# Patient Record
Sex: MALE | Race: WHITE | NOT HISPANIC OR LATINO | URBAN - METROPOLITAN AREA
[De-identification: names, ages, dates, MRNs, and addresses within clinical notes are randomized per-mention and may not be internally consistent; named-entity substitution may affect disease eponyms.]

---

## 2023-08-25 ENCOUNTER — TELEPHONE (OUTPATIENT)
Dept: ORTHOPEDICS | Facility: CLINIC | Age: 78
End: 2023-08-25
Payer: MEDICARE

## 2023-08-25 DIAGNOSIS — M25.561 ACUTE PAIN OF BOTH KNEES: Primary | ICD-10-CM

## 2023-08-25 DIAGNOSIS — M25.562 ACUTE PAIN OF BOTH KNEES: Primary | ICD-10-CM

## 2023-08-25 NOTE — TELEPHONE ENCOUNTER
I called the patient today regarding his appointment. The patient is coming in to town just to see Dr. Ness. He had a friend who had his knees replaced by Dr. Ness (maybe a ANDRIY). So the patient wanted to come down to see Dr. Ness. The patient verbalized understanding and has no further questions.

## 2023-09-14 ENCOUNTER — HOSPITAL ENCOUNTER (OUTPATIENT)
Dept: RADIOLOGY | Facility: HOSPITAL | Age: 78
Discharge: HOME | End: 2023-09-14
Attending: ORTHOPAEDIC SURGERY
Payer: MEDICARE

## 2023-09-14 ENCOUNTER — OFFICE VISIT (OUTPATIENT)
Dept: ORTHOPEDICS | Facility: CLINIC | Age: 78
End: 2023-09-14
Payer: MEDICARE

## 2023-09-14 VITALS — WEIGHT: 269.38 LBS | BODY MASS INDEX: 35.7 KG/M2 | HEIGHT: 73 IN

## 2023-09-14 DIAGNOSIS — M19.09 PRIMARY OSTEOARTHRITIS, OTHER SPECIFIED SITE: ICD-10-CM

## 2023-09-14 DIAGNOSIS — M25.561 ACUTE PAIN OF BOTH KNEES: ICD-10-CM

## 2023-09-14 DIAGNOSIS — M25.562 ACUTE PAIN OF BOTH KNEES: ICD-10-CM

## 2023-09-14 DIAGNOSIS — Z01.818 PRE-OP TESTING: ICD-10-CM

## 2023-09-14 DIAGNOSIS — M17.11 PRIMARY OSTEOARTHRITIS OF RIGHT KNEE: Primary | ICD-10-CM

## 2023-09-14 DIAGNOSIS — R73.03 PREDIABETES: ICD-10-CM

## 2023-09-14 PROCEDURE — 99999 PR PBB SHADOW E&M-EST. PATIENT-LVL II: CPT | Mod: PBBFAC,,, | Performed by: ORTHOPAEDIC SURGERY

## 2023-09-14 PROCEDURE — 73564 X-RAY EXAM KNEE 4 OR MORE: CPT | Mod: TC,50

## 2023-09-14 PROCEDURE — 73564 XR KNEE ORTHO BILAT WITH FLEXION: ICD-10-PCS | Mod: 26,50,, | Performed by: RADIOLOGY

## 2023-09-14 PROCEDURE — 73564 X-RAY EXAM KNEE 4 OR MORE: CPT | Mod: 26,50,, | Performed by: RADIOLOGY

## 2023-09-14 PROCEDURE — 99204 PR OFFICE/OUTPT VISIT, NEW, LEVL IV, 45-59 MIN: ICD-10-PCS | Mod: S$PBB,,, | Performed by: ORTHOPAEDIC SURGERY

## 2023-09-14 PROCEDURE — 99204 OFFICE O/P NEW MOD 45 MIN: CPT | Mod: S$PBB,,, | Performed by: ORTHOPAEDIC SURGERY

## 2023-09-14 PROCEDURE — 99212 OFFICE O/P EST SF 10 MIN: CPT | Mod: PBBFAC | Performed by: ORTHOPAEDIC SURGERY

## 2023-09-14 PROCEDURE — 99999 PR PBB SHADOW E&M-EST. PATIENT-LVL II: ICD-10-PCS | Mod: PBBFAC,,, | Performed by: ORTHOPAEDIC SURGERY

## 2023-09-14 RX ORDER — ATORVASTATIN CALCIUM 40 MG/1
40 TABLET, FILM COATED ORAL
COMMUNITY

## 2023-09-14 RX ORDER — HYDROCODONE BITARTRATE AND ACETAMINOPHEN 10; 325 MG/1; MG/1
1 TABLET ORAL 2 TIMES DAILY PRN
COMMUNITY
Start: 2023-06-29

## 2023-09-14 RX ORDER — LOSARTAN POTASSIUM 100 MG/1
1 TABLET ORAL DAILY
COMMUNITY
Start: 2023-06-29

## 2023-09-14 RX ORDER — MEXILETINE HYDROCHLORIDE 250 MG/1
250 CAPSULE ORAL EVERY 12 HOURS
COMMUNITY
Start: 2023-06-26

## 2023-09-14 RX ORDER — NAPROXEN SODIUM 220 MG/1
81 TABLET, FILM COATED ORAL
COMMUNITY
End: 2023-09-28

## 2023-09-14 RX ORDER — FUROSEMIDE 40 MG/1
40 TABLET ORAL
COMMUNITY

## 2023-09-14 RX ORDER — TAMSULOSIN HYDROCHLORIDE 0.4 MG/1
1 CAPSULE ORAL DAILY
COMMUNITY
Start: 2023-07-31

## 2023-09-14 RX ORDER — SULFAMETHOXAZOLE AND TRIMETHOPRIM 800; 160 MG/1; MG/1
1 TABLET ORAL 2 TIMES DAILY
COMMUNITY
Start: 2023-03-24 | End: 2023-09-28

## 2023-09-14 RX ORDER — MULTIVITAMIN
1 TABLET ORAL DAILY
COMMUNITY
End: 2023-09-28

## 2023-09-14 RX ORDER — ALBUTEROL SULFATE 0.63 MG/3ML
0.63 SOLUTION RESPIRATORY (INHALATION)
COMMUNITY

## 2023-09-14 RX ORDER — PREGABALIN 150 MG/1
150 CAPSULE ORAL EVERY 8 HOURS
COMMUNITY
Start: 2023-06-30

## 2023-09-14 RX ORDER — TORSEMIDE 20 MG/1
20 TABLET ORAL 2 TIMES DAILY
COMMUNITY
Start: 2023-06-29

## 2023-09-14 RX ORDER — TRAZODONE HYDROCHLORIDE 150 MG/1
150 TABLET ORAL
COMMUNITY
Start: 2023-06-29

## 2023-09-14 RX ORDER — FLUTICASONE FUROATE, UMECLIDINIUM BROMIDE AND VILANTEROL TRIFENATATE 100; 62.5; 25 UG/1; UG/1; UG/1
1 POWDER RESPIRATORY (INHALATION)
COMMUNITY
Start: 2023-06-29

## 2023-09-14 RX ORDER — ISOSORBIDE DINITRATE 10 MG/1
10 TABLET ORAL 2 TIMES DAILY
COMMUNITY
Start: 2023-06-29

## 2023-09-14 RX ORDER — FINASTERIDE 5 MG/1
5 TABLET, FILM COATED ORAL
COMMUNITY
Start: 2023-06-29

## 2023-09-14 RX ORDER — OMEPRAZOLE 40 MG/1
1 CAPSULE, DELAYED RELEASE ORAL DAILY
COMMUNITY
Start: 2023-05-09

## 2023-09-15 ENCOUNTER — TELEPHONE (OUTPATIENT)
Dept: ORTHOPEDICS | Facility: CLINIC | Age: 78
End: 2023-09-15
Payer: MEDICARE

## 2023-09-15 NOTE — TELEPHONE ENCOUNTER
Clearance letters sent for:   -pulmonology  - gastroenterology  -cardiology    Combo letter and labs sent to pcp's office.     Pt has hx of MRSA - will need bactroban pre/post op    Attempted to reach patient - no answer; mailbox full; has not signed up for portal.  Emailed patient and asked him to call me.  Need to ask about dental and ekg. Need to discuss plan.   Has appt with thoracic surgeon and cards next week.

## 2023-09-18 ENCOUNTER — PATIENT MESSAGE (OUTPATIENT)
Dept: ADMINISTRATIVE | Facility: OTHER | Age: 78
End: 2023-09-18
Payer: MEDICARE

## 2023-09-18 ENCOUNTER — TELEPHONE (OUTPATIENT)
Dept: ORTHOPEDICS | Facility: CLINIC | Age: 78
End: 2023-09-18
Payer: MEDICARE

## 2023-09-18 NOTE — TELEPHONE ENCOUNTER
Pt returned my call.   Will need dental clearance.  States he will call now to make appt.   Thinks that appt with thoracic surgeon is r/t stable lung nodule. Will review note after appt on 9/21.   States had ekg in June at cardiology visit.     Did let him know I've reached out to make sure his surgery will be approved; have sent clearance letters to providers; have sent labs done here as well as preop/post op request to pcp.   We discussed need for bactroban.     States he spoke with Patricia and Angelique with [facility] about PT.  Unsure who these folks are, but will try to figure it out so that he can be scheduled appropriately. Pt states last four of phone number are [phone].

## 2023-09-19 ENCOUNTER — TELEPHONE (OUTPATIENT)
Dept: ORTHOPEDICS | Facility: CLINIC | Age: 78
End: 2023-09-19
Payer: MEDICARE

## 2023-09-19 NOTE — TELEPHONE ENCOUNTER
Dental appointment will be on Sep 26 at 8am   [facility] in [place], [place]  Ph/ [phone]  Fax/ [phone]    PT upon returning home will be:   [facility]  Ph/ [phone]     Fax/ [phone]      Have made PT appts here, so that they can go through pre-auth process.  Have gotten auth for surgery itself.    Updated patient on both of these and the workflow for his stay while here. Pt v/u.  No questions at this time.     So far, I have been unable to find a recent ekg - may have him do this with pcp at appt.

## 2023-09-19 NOTE — TELEPHONE ENCOUNTER
----- Message from Ladonna Nicolas RN sent at 9/19/2023  4:44 PM CDT -----  Regarding: RE: pseudo ANDRIY patient: R TKA 11/1/23  Derrick Ness and Cameron,   Just wanted to update you that we've gotten approval from Medicare for surgery and PT while patient is on site.    I did hear back from patient's pulmonologist today.   They said: not clear. Has COPD and right lung lesion of ? Etiology.  Diff dx to include vascular lesion.       I can see the patient has an appt in a couple days with thoracic surgeon.  Patient states this appt may be re: nodule.  He says that he saw the same pramod a year ago, but I've yet to find the note. The nodule has been stable on CT x 1.5 years.    His PET from last year did show hypermetabolic area.  I have been able to find subsequent EGD encounters that have further information on this area.     Hope to do his medical questionnaire in the next day or two, but would appreciate knowing if there's anything further that either of you may need as the patient travels quite a bit and it may be hard to pin him down to get things done.     Thanks,  Ladonna    ----- Message -----  From: Khushi Barnes MD  Sent: 9/15/2023   9:30 AM CDT  To: Mary Kate Fontaine NP; Letty Fine RN; #  Subject: RE: pseudo ANDRIY patient: R TKA 11/1/23            Thank you   Please let me know,When done with the initial Medical Questionnaire     ----- Message -----  From: Ladonna Nicolas RN  Sent: 9/15/2023   9:29 AM CDT  To: Mary Kate Fontaine NP; #  Subject: RE: pseudo ANDRIY patient: R TKA 11/1/23            Emailed preservice mgmt for assistance in determining coverage     In anticipation of approval -     Clearance letters sent for:   -pulmonology  - gastroenterology  -cardiology    Combo letter and labs sent to pcp's office.     Pt has hx of MRSA - will need bactroban pre/post op    Attempted to reach patient - no answer; mailbox full; has not signed up for portal.  Emailed patient and asked him to call me.  Need to  ask about dental and ekg. Need to discuss plan.   Has appt with thoracic surgeon and cards next week.         ----- Message -----  From: Rober Ness III, MD  Sent: 9/15/2023   7:19 AM CDT  To: Mary Kate Fontaine NP; #  Subject: pseudo ANDRIY patient: R TKA 11/1/23                The patient lives in [place], he is friends with Vincenzo Norwood, who was one of our [facility] TKA patients.   He wants a TKA and flew himself here and showed up in clinic yesterday.   I've scheduled him for 11/1/23 which is a Wednesday  I got a set up pre-op labs yesterday which look good    Plan is that he will basically be a self funded [facility] patient so would be ideal if we could do a similar process to the [facility] patients:  -confirm that his insurance will cover surgery here at [facility]  -have him get a letter of medical clearance from his PCP in [facility]  -have him set up for all his pre-op visits with ortho + [facility] on 10/31/23  -will keep him 1 night at [place] and then he and wife will stay in [facility]  -see him back in clinic 11/7/23 for post op check/clearance to travel    -I did tell him that he may need an order for post op PT to come from his PCP as I dont have a [place] medical license    Thank you all,   Will

## 2023-09-21 ENCOUNTER — DOCUMENTATION ONLY (OUTPATIENT)
Dept: INTERNAL MEDICINE | Facility: CLINIC | Age: 78
End: 2023-09-21
Payer: MEDICARE

## 2023-09-21 DIAGNOSIS — R73.03 PREDIABETES: ICD-10-CM

## 2023-09-21 DIAGNOSIS — R91.1 LESION OF LUNG: ICD-10-CM

## 2023-09-21 DIAGNOSIS — Z86.14 HISTORY OF MRSA INFECTION: ICD-10-CM

## 2023-09-21 DIAGNOSIS — Z92.89 HISTORY OF STRESS TEST: ICD-10-CM

## 2023-09-21 DIAGNOSIS — F11.90 OPIOID USE: ICD-10-CM

## 2023-09-21 DIAGNOSIS — Z87.438 HISTORY OF BPH: ICD-10-CM

## 2023-09-21 DIAGNOSIS — J44.9 CHRONIC OBSTRUCTIVE PULMONARY DISEASE, UNSPECIFIED COPD TYPE: ICD-10-CM

## 2023-09-21 DIAGNOSIS — Z01.89 LABORATORY TEST: ICD-10-CM

## 2023-09-21 DIAGNOSIS — I49.3 PVC (PREMATURE VENTRICULAR CONTRACTION): ICD-10-CM

## 2023-09-21 PROBLEM — M25.562 CHRONIC PAIN OF BOTH KNEES: Status: ACTIVE | Noted: 2020-12-23

## 2023-09-21 PROBLEM — N40.0 BENIGN PROSTATIC HYPERPLASIA: Status: ACTIVE | Noted: 2020-12-23

## 2023-09-21 PROBLEM — G47.33 OSA (OBSTRUCTIVE SLEEP APNEA): Status: ACTIVE | Noted: 2022-01-10

## 2023-09-21 PROBLEM — E66.9 OBESITY (BMI 30.0-34.9): Status: ACTIVE | Noted: 2022-01-10

## 2023-09-21 PROBLEM — M25.561 CHRONIC PAIN OF BOTH KNEES: Status: ACTIVE | Noted: 2020-12-23

## 2023-09-21 PROBLEM — Z87.440 HISTORY OF UTI: Status: ACTIVE | Noted: 2020-12-23

## 2023-09-21 PROBLEM — E78.5 HYPERLIPIDEMIA: Status: ACTIVE | Noted: 2020-12-23

## 2023-09-21 PROBLEM — E66.811 OBESITY (BMI 30.0-34.9): Status: ACTIVE | Noted: 2022-01-10

## 2023-09-21 PROBLEM — I10 ESSENTIAL HYPERTENSION: Status: ACTIVE | Noted: 2020-12-23

## 2023-09-21 PROBLEM — G89.29 CHRONIC BILATERAL LOW BACK PAIN WITH BILATERAL SCIATICA: Status: ACTIVE | Noted: 2020-12-23

## 2023-09-21 PROBLEM — M54.42 CHRONIC BILATERAL LOW BACK PAIN WITH BILATERAL SCIATICA: Status: ACTIVE | Noted: 2020-12-23

## 2023-09-21 PROBLEM — N40.0 BENIGN PROSTATIC HYPERPLASIA: Status: RESOLVED | Noted: 2020-12-23 | Resolved: 2023-09-21

## 2023-09-21 PROBLEM — K21.9 GASTROESOPHAGEAL REFLUX DISEASE: Status: ACTIVE | Noted: 2020-12-23

## 2023-09-21 PROBLEM — G89.29 CHRONIC PAIN OF BOTH KNEES: Status: ACTIVE | Noted: 2020-12-23

## 2023-09-21 PROBLEM — M54.41 CHRONIC BILATERAL LOW BACK PAIN WITH BILATERAL SCIATICA: Status: ACTIVE | Noted: 2020-12-23

## 2023-09-21 PROBLEM — G62.9 NEUROPATHY: Status: ACTIVE | Noted: 2020-12-23

## 2023-09-21 NOTE — PROGRESS NOTES
[facility] [address]  Progress Note    Patient Name: Richi Funk  MRN: [id number]  Date of Evaluation- 09/21/2023  PCP- No, Primary Doctor    Future cases for Richi Funk [[id number]]       Case ID Status Date Time Alonso Procedure Provider Location    [id number] [facility] 11/1/2023  1:50  ARTHROPLASTY, KNEE, TOTAL: RIGHT: DEPUY - ATTRober Avelar III, MD [[id number]] [facility] OR            HPI:  Chart reviewed in preparation for joint replacement surgery   Male of age 78 years    Health conditions of significance for the perioperative period      - BMI 36.04-  - COPD and right lung lesion of ? Etiology  - Opioid use   - BPH on flomax  - Spine surgery - decreased sensation L knee to foot  - Pre DM  Frequent UTI's  Lung nodule - serial surveillance   - HTN  - MELIDA           Assessment/Plan:     Lesion of lung  As per the correspondence from the nursing staff, he is due to see the thoracic surgeon about this     March 2023      Stable chest. No acute process.     Somewhat tubular/nodular density within the right upper lobe which may be   vascular in etiology. Stability suggests benign etiology. Delayed follow-up   chest CT in 1 year can be obtained to confirm long-term stability.     Pulmonary evaluation from June 2023    Impression: Thus far lesion right upper lobe has benign behavior, moderate COPD, Radiology has raised question of this lung lesion being vascular lesion, perhaps AVM.    Plan: Continue treatment of COPD. Advised patient of impression of radiologist most recent CT chest, before patient proceeds with biopsy I recommend he go back and follow-up as he is already scheduled to do with CTS Dr. Camejo next month to let him know about this CT report, discuss the plan for biopsy. Explained what AVMs are, the need for dealing with them. Told him that [facility] is available on specialty level for vascular lesion as well if he wanted. Overall appears stable         History of MRSA infection  I suggest nasal Bactroban  decolonization in attempt to prevent surgical site infection prophylaxis       Opioid use  Norco   I suggest working with his prescribing MD about possibly reducing the Norco, if able to, to reduce the opioid tolerance in preparation for the upcoming joint replacement surgery    History of BPH  He will be at risk of postoperative urinary retention given the BPH know and I suggest monitoring his bladder volume during and immediately after surgery to be able to address potential bladder stretch and urinary retention as a result  I suggest using straight catheterization to decompress the bladder before it gets to the point of bladder stretching and urinary retention    BMI 36.0-36.9,adult  Noted  Hypertension   GERD  Osteoarthritis    COPD (chronic obstructive pulmonary disease)  On Trelegy, albuterol    Feb 2021    IMPRESSION:  Minimal obstructive lung disease with insignificant response to bronchodilator.        History of stress test  July 2023    No evidence of myocardial infarction. No evidence of induced myocardial   ischemia.     June 2023        Nov 2022        May 2022    1. Sinus bradycardia.   2. Frequent PVCs accounting for 9% of total QRSs.  These are randomly presented without any pattern.  There is no VT.      Jan 2022    Left main: 0%   Left anterior descending artery: 40% mid lesion   Left circumflex artery: 30% prox lesion   Right coronary artery: 60% distal lesion     iFR of distal RCA was 1.00 consistent with non-obstructive disease.     Conclusion: significant but non-obstructive coronary artery disease          Laboratory test  A1c from September 14 th 2023 was 5.8  INR- 1.1  Comprehensive metabolic panel is unremarkable   CBC is unremarkable    PVC (premature ventricular contraction)  Cardiac evaluation from June 2023     IMPRESSION:  ICD-10-CM ICD-9-CM   1. Stable angina pectoris I20.8 413.9       ASSESSMENT & PLAN: Frequent PVCs controlled on mexiletine. Last count the PVCs were 9% on the  Holter. Ejection fraction 55 with most recent echo.  Chest pain lexiscan.  CAD hx pvc cobntrolled      Prediabetes  Most recent A1c from September 2023 is 5.8

## 2023-09-21 NOTE — ASSESSMENT & PLAN NOTE
A1c from September 14 th 2023 was 5.8  INR- 1.1  Comprehensive metabolic panel is unremarkable   CBC is unremarkable

## 2023-09-21 NOTE — ASSESSMENT & PLAN NOTE
He will be at risk of postoperative urinary retention given the BPH know and I suggest monitoring his bladder volume during and immediately after surgery to be able to address potential bladder stretch and urinary retention as a result  I suggest using straight catheterization to decompress the bladder before it gets to the point of bladder stretching and urinary retention

## 2023-09-21 NOTE — ASSESSMENT & PLAN NOTE
Norco   I suggest working with his prescribing MD about possibly reducing the Norco, if able to, to reduce the opioid tolerance in preparation for the upcoming joint replacement surgery

## 2023-09-21 NOTE — ASSESSMENT & PLAN NOTE
I suggest nasal Bactroban decolonization in attempt to prevent surgical site infection prophylaxis

## 2023-09-21 NOTE — ASSESSMENT & PLAN NOTE
July 2023    No evidence of myocardial infarction. No evidence of induced myocardial   ischemia.     June 2023        Nov 2022        May 2022    1. Sinus bradycardia.   2. Frequent PVCs accounting for 9% of total QRSs.  These are randomly presented without any pattern.  There is no VT.      Jan 2022    Left main: 0%   Left anterior descending artery: 40% mid lesion   Left circumflex artery: 30% prox lesion   Right coronary artery: 60% distal lesion     iFR of distal RCA was 1.00 consistent with non-obstructive disease.     Conclusion: significant but non-obstructive coronary artery disease

## 2023-09-21 NOTE — ASSESSMENT & PLAN NOTE
Cardiac evaluation from June 2023     IMPRESSION:  ICD-10-CM ICD-9-CM   1. Stable angina pectoris I20.8 413.9       ASSESSMENT & PLAN: Frequent PVCs controlled on mexiletine. Last count the PVCs were 9% on the Holter. Ejection fraction 55 with most recent echo.  Chest pain lexiscan.  CAD hx pvc cobntrolled

## 2023-09-21 NOTE — ASSESSMENT & PLAN NOTE
On Trelegy, albuterol    Feb 2021    IMPRESSION:  Minimal obstructive lung disease with insignificant response to bronchodilator.

## 2023-09-21 NOTE — ASSESSMENT & PLAN NOTE
As per the correspondence from the nursing staff, he is due to see the thoracic surgeon about this     March 2023      Stable chest. No acute process.     Somewhat tubular/nodular density within the right upper lobe which may be   vascular in etiology. Stability suggests benign etiology. Delayed follow-up   chest CT in 1 year can be obtained to confirm long-term stability.     Pulmonary evaluation from June 2023    Impression: Thus far lesion right upper lobe has benign behavior, moderate COPD, Radiology has raised question of this lung lesion being vascular lesion, perhaps AVM.    Plan: Continue treatment of COPD. Advised patient of impression of radiologist most recent CT chest, before patient proceeds with biopsy I recommend he go back and follow-up as he is already scheduled to do with CTS Dr. Camejo next month to let him know about this CT report, discuss the plan for biopsy. Explained what AVMs are, the need for dealing with them. Told him that [facility] is available on specialty level for vascular lesion as well if he wanted. Overall appears stable

## 2023-09-27 ENCOUNTER — TELEPHONE (OUTPATIENT)
Dept: ORTHOPEDICS | Facility: CLINIC | Age: 78
End: 2023-09-27
Payer: MEDICARE

## 2023-09-27 NOTE — TELEPHONE ENCOUNTER
Contacted patient - has not spoken with pcp's office.  Will call tomorrow.  Emailed patient a copy of the letter as well as my direct number for MD to call if he has questions.     Will send dental clearance form as appt was on 9/26.      Pt states he cannot go for pcp clearance visit until after he returns home on 10/16.  Pt needs ekg.     Will do med review and discuss Dr. Barnes's questions tomorrow - pt req 1300.      Haven't rec'd response to cards and GI clearance requests.  Letters refaxed at this time.

## 2023-09-28 ENCOUNTER — TELEPHONE (OUTPATIENT)
Dept: ORTHOPEDICS | Facility: CLINIC | Age: 78
End: 2023-09-28
Payer: MEDICARE

## 2023-09-28 RX ORDER — MONTELUKAST SODIUM 10 MG/1
10 TABLET ORAL NIGHTLY
COMMUNITY
Start: 2023-06-29

## 2023-09-28 NOTE — TELEPHONE ENCOUNTER
HAS PCP CLEARANCE APPT ON 10/21/23    Spoke to patient on: 9/28/23    Surgeon: Rober Ness    Surgery: right Knee    Travel caregiver: wife    Will travel via: car    BMI:  36.04    Social Hx:     Updated in Epic              Patient works at retired            Plan to return home after surgery:  Yes            Have support to help with care and appointments: Yes     Allergies:    Updated in epic    Medication list:    Updated in epic    Covid-19:   - vaccine: yes   - diagnosed with: yes, no hospitalization, no O2          Health Hx:   Updated in [facility]    Surgical Hx:    Updated in Epic    Can you take one flight of stairs: yes    RCRI:   - Coronary Artery Disease: yes   - Congestive Heart Failure: no   - Cerebrovascular Disease: no   - Diabetes Mellitus on insulin: no      ROS:   - Fever/chills: no   - Infection: no   - Cough/Resp Issues:  no   - Bleeding/blood loss (vaginal, rectal, vomiting, urination):  no   - GI issues/Acid Reflux/Vomiting: yes   - Fatty liver: no   - UTI: no   - Constipation:  yes - occasional , will bring miralax with him   - MRSA colonization: yes - after brown recluse bite  - Skin issues (rashes, blisters, boils, bumps): no    - Recent injection in operative joint: no   - Strokes or Passing out: no   - Blood Thinners or ASA: no   - Blood clot in heart/lung/extremity: no   - Stents: no   - Depression or Anxiety: no   - Problems with anesthesia:  no   - If female, having periods/pregnant: N/A  - Vision problems: no  - Dental exam recently: yes    STOPBANG   Has MELIDA dx, does not use CPAP     Family Hx:    Updated in Epic        Please check if he has ongoing chest pains  --- no  how  is he  urinating ? Is he able to empty the bladder completely  -- urinating without issue, able to empty bladder  Spine surgery - lumbar fusion in 2008, [place], [place]; Dr. Hernández  Narcotic use - 1 tab po every 2-3 days - no issue with stopping in advance if necessary  Pt aware of  Bactroban 2 days before, day of, and 2 days after surgery intranasally BID  Reports doing okay with asthma, not using rescue inhaler regularly, does rinse mouth after using Trelegy  Neuropathy - nerve was cut during lumbar spine surgery and lost most of feeling left knee down, no neuropathy anywhere else  No current UTI, will call if something changes. Will also call if any skin issues arise.  RE: furosemide and torsemide - was recently called by pcp and told he shouldn't be taking both but they've not called back to tell him which to stop          Knee replacement patients will have several weeks of PT after returning home.

## 2023-10-16 ENCOUNTER — DOCUMENTATION ONLY (OUTPATIENT)
Dept: INTERNAL MEDICINE | Facility: CLINIC | Age: 78
End: 2023-10-16
Payer: MEDICARE

## 2023-10-16 ENCOUNTER — TELEPHONE (OUTPATIENT)
Dept: ORTHOPEDICS | Facility: CLINIC | Age: 78
End: 2023-10-16
Payer: MEDICARE

## 2023-10-16 DIAGNOSIS — Z98.890 PERSONAL HISTORY OF SPINE SURGERY: ICD-10-CM

## 2023-10-16 DIAGNOSIS — F11.90 OPIOID USE: ICD-10-CM

## 2023-10-16 DIAGNOSIS — K59.00 CONSTIPATION, UNSPECIFIED CONSTIPATION TYPE: ICD-10-CM

## 2023-10-16 DIAGNOSIS — G62.9 NEUROPATHY: ICD-10-CM

## 2023-10-16 DIAGNOSIS — G47.33 OSA (OBSTRUCTIVE SLEEP APNEA): ICD-10-CM

## 2023-10-16 DIAGNOSIS — Z86.16 HISTORY OF COVID-19: ICD-10-CM

## 2023-10-16 NOTE — PROGRESS NOTES
[facility] [address]  Progress Note    Patient Name: Richi Funk  MRN: [id number]  Date of Evaluation- 10/16/2023  PCP- No, Primary Doctor    Future cases for Richi Funk [[id number]]       Case ID Status Date Time Alonso Procedure Provider Location    [id number] [facility] 11/1/2023  1:47  ARTHROPLASTY, KNEE, TOTAL: RIGHT: DEPUY - Rober Cabral III, MD [[id number]] [facility] OR            HPI:  Followed up     Received correspondence       -he has an appt for pcp clearance on 10/21/23           Assessment/Plan:     Constipation  Miralax    MELIDA (obstructive sleep apnea)   Has MELIDA dx, does not use CPAP          Neuropathy    Neuropathy - nerve was cut during lumbar spine surgery and lost most of feeling left knee down, no neuropathy anywhere else           Opioid use    Narcotic use - 1 tab po every 2-3 days - no issue with stopping in advance if necessary

## 2023-10-16 NOTE — TELEPHONE ENCOUNTER
Patient returned call.     Asked about neuropathy.  Patient reports last he does have some minor issues with balance, but over all does not think there will be any issues with ambulation post op.   Reports he has no issues with swallowing.     I let him know that his cardiologist's office has not responded to optimization statement. He has an appt with their office on 10/20.  I asked him to please ask provider to put statement in note as we can see records in Epic.   He is seeing thoracic surgeon on 10/24.    He is seeing pcp on 10/26.      He and I discussed medication hold preop.    Advised him to arrive on 10/30 in anticipation of all preop appts on 10/31.   Planning for surgery on 11/1.

## 2023-10-16 NOTE — ASSESSMENT & PLAN NOTE
Narcotic use - 1 tab po every 2-3 days - no issue with stopping in advance if necessary

## 2023-10-18 DIAGNOSIS — M17.11 PRIMARY OSTEOARTHRITIS OF RIGHT KNEE: Primary | ICD-10-CM

## 2023-10-19 ENCOUNTER — TELEPHONE (OUTPATIENT)
Dept: ORTHOPEDICS | Facility: CLINIC | Age: 78
End: 2023-10-19
Payer: MEDICARE

## 2023-10-19 NOTE — TELEPHONE ENCOUNTER
Patient left voicemail requesting a return call.    Confirming arrival date (10/30).  Patient has appt tomorrow - advised him this is with cardiology for testing needed for clearance.    He asked about bactroban - advised pcp at home will need to order. I've sent a letter to NP at primary care and resent Jonny copy of the letter.

## 2023-10-27 ENCOUNTER — TELEPHONE (OUTPATIENT)
Dept: ORTHOPEDICS | Facility: CLINIC | Age: 78
End: 2023-10-27
Payer: MEDICARE

## 2023-10-27 ENCOUNTER — DOCUMENTATION ONLY (OUTPATIENT)
Dept: INTERNAL MEDICINE | Facility: CLINIC | Age: 78
End: 2023-10-27
Payer: MEDICARE

## 2023-10-27 DIAGNOSIS — I51.9 LEFT VENTRICULAR SYSTOLIC DYSFUNCTION (LVSD): ICD-10-CM

## 2023-10-27 DIAGNOSIS — R91.1 LESION OF LUNG: ICD-10-CM

## 2023-10-27 NOTE — ASSESSMENT & PLAN NOTE
Neuropathy.  Patient reports last he does have some minor issues with balance, but over all does not think there will be any issues with ambulation post op.

## 2023-10-27 NOTE — TELEPHONE ENCOUNTER
I called the patient today regarding his surgery. The patient stated that he has to go wear a monitoring device. I told him that we have to get a clearance letter from his cardiologist before we get him back on the schedule. The patient verbalized understanding and has no further questions.           ----- Message from Karla Harrison sent at 10/27/2023 11:16 AM CDT -----  Regarding: PT'S RETURNING A CALL BACK FROM [facility] REGARDING SURGERY DATE  Contact: PT  Pt's requesting a call back from staff..       Confirmed contact info below:  Contact Name: Richi Funk  Phone Number: [phone]

## 2023-10-27 NOTE — ASSESSMENT & PLAN NOTE
RIGHT Upper lobe lung nodule, stable over 1 year. He is a candidate for ongoing surveillance.    Repeat CT chest in 1 year

## 2023-10-27 NOTE — TELEPHONE ENCOUNTER
I called the patient today regarding his surgery. Left voice message for the patient, left a message stating that his cardiologist hasn't cleared him for surgery. Itold the patient that once he is cleared for surgery then we will get him back on the schedule. I left my name and contact information for the patient to call back.         ----- Message from Khushi Barnes MD sent at 10/27/2023  9:35 AM CDT -----  Thank you   Understandable   Probably have to reschedule his surgery     ----- Message -----  From: Ladonna Nicolas RN  Sent: 10/27/2023   9:28 AM CDT  To: Khushi Barnes MD; #    Cardiologist sent back not clear  ----- Message -----  From: Khushi Barnes MD  Sent: 10/27/2023   8:43 AM CDT  To: Zoie Salazar RN; Ladonna Nicolas RN; #    Good morning   I worked on his chart this AM    Echo from 10/20 th showed - mildly reduced Ejection fraction , Severe Aortic valve regurgitation ( Below ) and the plan was to start Entresto and to follow up on BP    We would definitely need Cardiology in put  here given valve regurgitation   Will, I see that his surgery is at [place] . It would be good to let the Anesthesiology know about his Aortic Regurgitation     Ladonna, based on your note , his functional capacity seems to be good- Can  take one flight of stairs:      I see that he is not scheduled with Anesthesiologist on the [address], please schedule with Anesthesiology at 8   I  will see him after Anesthesiology evaluation             -----------------------------------------------  Echo shows LV systolic function is mildly reduced at 45-50%. If he is in agreement, would recommend discontinuing losartan, and starting Entresto 36 hours post and discontinuation of losartan. Please ask him to monitor blood pressure and report if less than 100/50 consistently. Obtain BMP in 1 to 2 weeks after starting Entresto. Please move my appointment with him up. I would like to see him in 4 to 6  weeks.    Summary and Conclusion:  - Left ventricle: The cavity size is dilated. Wall thickness is increased.  Systolic function is mildly reduced. The estimated ejection fraction is  45-50%.  - Right ventricle: The cavity size is dilated. The estimated peak pressure is  26mm Hg.  - Left atrium: The atrium is dilated.  - Right atrium: The atrium is dilated.  - Aortic valve: The annulus is mildly calcified. The valve is trileaflet. The  leaflets are thickened. There is severe regurgitation. The peak systolic  velocity is 239cm/sec. The mean systolic gradient is 10mm Hg.  - Mitral valve: There is mild regurgitation.  - Tricuspid valve: There is mild regurgitation.  Comparison: Compared to the previous study, aortic regurgitationhas worsened.  ---------------------------------------------      ----- Message -----  From: Ladonna Nicolas RN  Sent: 10/26/2023   1:53 PM CDT  To: Khushi Barnes MD; Dannie Soliz LPN; #    I re-sent cards clearance 2 days ago.  Have not rec'd response    Pt saw pcp today. There is no statement of clearance.  They state they haven't rec'd the letter I've sent three times.  Will send again.      They did at least rx the bactroban as requested.    ----- Message -----  From: Rober Ness III, MD  Sent: 10/24/2023   4:40 PM CDT  To: Khushi Barnes MD; Dannie Soliz LPN; #    Thank you!!!!  ----- Message -----  From: Khushi Barnes MD  Sent: 10/24/2023   2:40 PM CDT  To: Dannie Soliz LPN; Ladonna Nicolas RN; #    Sounds ceasar Dougherty  Thank you for making this happen     ----- Message -----  From: Ladonna Nicolas RN  Sent: 10/24/2023   2:36 PM CDT  To: Khushi Barnes MD; Dannie Soliz LPN; #    Good afternoon,     We are getting there!  Thoracic surgeon put in note from today that the the nodule is stable and RTC in 1 year.   Cardiology NP told me last week that she would clear after echo.  That was done and it looks like she called him with recommendations.   I can re-send the clearance letter now.    Remaining is pcp on Thursday :)     Ladonna Marr    ----- Message -----  From: Khushi Barnes MD  Sent: 10/17/2023   5:18 PM CDT  To: Dannie Soliz LPN; Ladonna Nicolas RN; #    Thank you   ----- Message -----  From: Rober Ness III, MD  Sent: 10/17/2023   4:33 PM CDT  To: Khushi Barnes MD; Dannie Soliz LPN; #    Thank you all    I was not concerned about neuropathy on my exam    ----- Message -----  From: Khushi Barnes MD  Sent: 10/16/2023   4:18 PM CDT  To: Dannie Soliz LPN; Ladonna Nicolas RN; #    Thank you   ----- Message -----  From: Ladonna Nicolas RN  Sent: 10/16/2023   3:15 PM CDT  To: Khushi Barnes MD; Dannie Soliz LPN; #    He returned my call.     Asked about neuropathy.  Patient reports last he does have some minor issues with balance, but over all does not think there will be any issues with ambulation post op.   Reports he has no issues with swallowing.      I let him know that his cardiologist's office has not responded to optimization statement. He has an appt with their office on 10/20.  I asked him to please ask provider to put statement in note as we can see records in Epic.   He is seeing thoracic surgeon on 10/24.    He is seeing pcp on 10/26.       He and I discussed medication hold preop.    Advised him to arrive on 10/30 in anticipation of all preop appts on 10/31.   Planning for surgery on 11/1.     ----- Message -----  From: Khushi Barnes MD  Sent: 10/16/2023   6:30 AM CDT  To: Dannie Soliz LPN; Ladonna Nicolas RN; #    Thanks Ladonna    I worked on his chart this morning     With regards to neuropathy , decreased sensation down the left knee, is he able to walk with no problems ? So that this does not affect the recovery from the opposite knee replacement     Cardiologist statement of optimization will be useful     GI note indicates esophageal dysmotility   How is he doing with his  Swallowig ?    How did the thoracic surgery evaluation go with regards to the lung lesion - Please obtain the note     Looks like we have Cardiology, PCP, Pulmonology clearances pending     I see that we have him for surgery on 11/ 1     Thank you        ----- Message -----  From: Ladonna Nicolas RN  Sent: 9/28/2023   2:04 PM CDT  To: Khushi Barnes MD; Dannie Soliz LPN; #    Good afternoon!     I have updates:    -Med review is done and all of your below questions are addressed at the bottom - please see my phone note dated today (9/28/23).  -Has had another CT  -I've re-sent cards and gi clearance letters  - dental clearance was sent yesterday (appt was 9/26/23)  -he has an appt for pcp clearance on 10/21/23   - have location for his PT once he gets back home  - I did ask about spine surgery.  He was vague, but we've determined it was a lumbar fusion. Does not know level. This was done in 2008.  [facility] only goes back to 2015.  I called the facility.  They said there's a slight chance they'd have it, but it is in storage and have to be requested and that would take a few weeks to process.     Please let me know if you have further questions.    Ladonna Marr    ----- Message -----  From: Khushi Barnes MD  Sent: 9/21/2023   2:32 PM CDT  To: Dannie Soliz LPN; Ladonna Nicolas RN; #    Ladonna    I reviewed his chart this afternoon   Thankfully, lot of his information is in care    When you do medical questionnaire   Please check on the following     With regards to lung lesion CT from March 2023 reportedly showed  Stable chest. No acute process.   Somewhat tubular/nodular density within the right upper lobe which may be   vascular in etiology. Stability suggests benign etiology. Delayed follow-up   chest CT in 1 year can be obtained to confirm long-term stability.   It appears to me that his chest lesion is stable based on the CT report  Pulmonologist evaluation suggests possible  ELOISA  Cardiovascular surgeons note indicates patient has preference to have this evaluated with a biopsy    Last cardiologist's note from June 2023 indicates chest pain   His stress test since that time was reassuring   Please check if he has ongoing chest pains   Please obtain cardiologist's optimization note for surgery    BPH-how  is he  urinating ? Is he able to empty the bladder completely as , if he is unable to empty the bladder, he will be at risk of postoperative urinary retention ( POUR)     Noted history of spine surgery-please obtain  information about spine surgery, if it is lumbar, cervical   please obtain the OR note so that we know which level the surgery was done so that the anesthesiologist will benefit from this information    I see Norco use-I suggest he works with his [place] prescribing MD to possibly try to reduce the pain medication use in preparation for surgery, to reduce opioid tolerance    Does he have any high BMI related health conditions like fatty liver  I see that he does have hypertension and prediabetes , osteoarthritis, prediabetes      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  I see that he is on Trelegy which is a combination of long-acting beta agonist, inhaled corticosteroid long-acting muscarinic antagonist   Hope he knows to rinse his mouth after using the steroid inhaler, as without that he will be at risk of oral thrush  Is he needing rescue albuterol much?    I see mention of neuropathy- I also see mention of spine surgery - decreased sensation L knee to foot  Does he have any other symptoms of neuropathy?    I saw mention of frequent UTIs   Any recent problem with UTIs?  Or any current symptoms of UTI  Please let him know to call us if he has any UTI symptoms closer to surgery  and address the UTI before leaving for  surgery    Is he on CPAP/BiPAP for his sleep apnea?    Based on his medication list,  he is taking both Lasix and torsemide- it is uncommon for individuals to take both loop diuretics , hence he might not be taking both of them    Please reconcile medication from outside sources    Sorry about this lengthy note

## 2023-10-27 NOTE — PROGRESS NOTES
[facility]  Progress Note    Patient Name: Richi Funk  MRN: [id number]  Date of Evaluation- 10/27/2023  PCP- No, Primary Doctor    Future cases for Richi Funk [[id number]]       Case ID Status Date Time Alonso Procedure Provider Location    [id number] [facility] 11/1/2023  1:17  ARTHROPLASTY, KNEE, TOTAL: RIGHT: DEPUY - ATTRober Avelar III, MD [[id number]] [facility] OR            HPI:  Followed up     Reports he has no issues with swallowing.            Assessment/Plan:     Neuropathy  Neuropathy.  Patient reports last he does have some minor issues with balance, but over all does not think there will be any issues with ambulation post op.       Lesion of lung   RIGHT Upper lobe lung nodule, stable over 1 year. He is a candidate for ongoing surveillance.    Repeat CT chest in 1 year          Left ventricular systolic dysfunction (LVSD)  Echo shows LV systolic function is mildly reduced at 45-50%.  Summary and Conclusion:  - Left ventricle: The cavity size is dilated. Wall thickness is increased.  Systolic function is mildly reduced. The estimated ejection fraction is  45-50%.  - Right ventricle: The cavity size is dilated. The estimated peak pressure is  26mm Hg.  - Left atrium: The atrium is dilated.  - Right atrium: The atrium is dilated.  - Aortic valve: The annulus is mildly calcified. The valve is trileaflet. The  leaflets are thickened. There is severe regurgitation. The peak systolic  velocity is 239cm/sec. The mean systolic gradient is 10mm Hg.  - Mitral valve: There is mild regurgitation.  - Tricuspid valve: There is mild regurgitation.  Comparison: Compared to the previous study, aortic regurgitationhas worsened.    Suggest Cardiology input  Anesthesiologist be aware of the Aortic Regurgitation

## 2023-10-27 NOTE — ASSESSMENT & PLAN NOTE
Echo shows LV systolic function is mildly reduced at 45-50%.  Summary and Conclusion:  - Left ventricle: The cavity size is dilated. Wall thickness is increased.  Systolic function is mildly reduced. The estimated ejection fraction is  45-50%.  - Right ventricle: The cavity size is dilated. The estimated peak pressure is  26mm Hg.  - Left atrium: The atrium is dilated.  - Right atrium: The atrium is dilated.  - Aortic valve: The annulus is mildly calcified. The valve is trileaflet. The  leaflets are thickened. There is severe regurgitation. The peak systolic  velocity is 239cm/sec. The mean systolic gradient is 10mm Hg.  - Mitral valve: There is mild regurgitation.  - Tricuspid valve: There is mild regurgitation.  Comparison: Compared to the previous study, aortic regurgitationhas worsened.

## 2023-10-30 ENCOUNTER — TELEPHONE (OUTPATIENT)
Dept: ORTHOPEDICS | Facility: CLINIC | Age: 78
End: 2023-10-30
Payer: MEDICARE

## 2023-10-30 NOTE — TELEPHONE ENCOUNTER
Surgery planned 11/1/23 put on hold  Not cleared by cardiology    ----- Message from Bassam Jenelle sent at 10/27/2023  9:39 AM CDT -----  Good morning,     Thank you Ladonna. I can call him and let him know that once he is cleared by his cardiologist then we will put him back on the schedule.     Thank you!    Sincerely,   Adams Almanzar MS, OTC  OR & Clinical Assistant to Dr. Rober Ness III  Phone: [phone]  Fax: [phone]    ----- Message -----  From: Khushi Barnes MD  Sent: 10/27/2023   9:36 AM CDT  To: Zoie Salazar RN; Ladonna Nicolas RN; #    Thank you   Understandable   Probably have to reschedule his surgery     ----- Message -----  From: Ladonna Nicolas RN  Sent: 10/27/2023   9:28 AM CDT  To: hKushi Barnes MD; #    Cardiologist sent back not clear  ----- Message -----  From: Khushi Barnes MD  Sent: 10/27/2023   8:43 AM CDT  To: Zoie Salazar, NATALIE; Ladonna Nicolas RN; #    Good morning   I worked on his chart this AM    Echo from 10/20 th showed - mildly reduced Ejection fraction , Severe Aortic valve regurgitation ( Below ) and the plan was to start Entresto and to follow up on BP    We would definitely need Cardiology in put  here given valve regurgitation   Will, I see that his surgery is at [place] . It would be good to let the Anesthesiology know about his Aortic Regurgitation     Ladonna, based on your note , his functional capacity seems to be good- Can  take one flight of stairs:      I see that he is not scheduled with Anesthesiologist on the [address], please schedule with Anesthesiology at 8   I  will see him after Anesthesiology evaluation             -----------------------------------------------  Echo shows LV systolic function is mildly reduced at 45-50%. If he is in agreement, would recommend discontinuing losartan, and starting Entresto 36 hours post and discontinuation of losartan. Please ask him to monitor blood pressure and report if  less than 100/50 consistently. Obtain BMP in 1 to 2 weeks after starting Entresto. Please move my appointment with him up. I would like to see him in 4 to 6 weeks.    Summary and Conclusion:  - Left ventricle: The cavity size is dilated. Wall thickness is increased.  Systolic function is mildly reduced. The estimated ejection fraction is  45-50%.  - Right ventricle: The cavity size is dilated. The estimated peak pressure is  26mm Hg.  - Left atrium: The atrium is dilated.  - Right atrium: The atrium is dilated.  - Aortic valve: The annulus is mildly calcified. The valve is trileaflet. The  leaflets are thickened. There is severe regurgitation. The peak systolic  velocity is 239cm/sec. The mean systolic gradient is 10mm Hg.  - Mitral valve: There is mild regurgitation.  - Tricuspid valve: There is mild regurgitation.  Comparison: Compared to the previous study, aortic regurgitationhas worsened.  ---------------------------------------------      ----- Message -----  From: Ladonna Nicolas RN  Sent: 10/26/2023   1:53 PM CDT  To: Khushi Barnes MD; Dannie Soliz LPN; #    I re-sent cards clearance 2 days ago.  Have not rec'd response    Pt saw pcp today. There is no statement of clearance.  They state they haven't rec'd the letter I've sent three times.  Will send again.      They did at least rx the bactroban as requested.    ----- Message -----  From: Rober Ness III, MD  Sent: 10/24/2023   4:40 PM CDT  To: Khushi Barnes MD; Dannie Soliz LPN; #    Thank you!!!!  ----- Message -----  From: Khushi Barnes MD  Sent: 10/24/2023   2:40 PM CDT  To: Dannie Soliz LPN; Ladonna Nicolas RN; #    Sounds good Ladonna  Thank you for making this happen     ----- Message -----  From: Ladonna Nicolas RN  Sent: 10/24/2023   2:36 PM CDT  To: Khushi Barnes MD; Dannie Soliz LPN; #    Good afternoon,     We are getting there!  Thoracic surgeon put in note from today that the the nodule is stable  and RTC in 1 year.   Cardiology NP told me last week that she would clear after echo.  That was done and it looks like she called him with recommendations.  I can re-send the clearance letter now.    Remaining is pcp on Thursday :)     Ladonna Marr    ----- Message -----  From: Khushi Barnes MD  Sent: 10/17/2023   5:18 PM CDT  To: Dannie Soliz LPN; Ladonna Nicolas RN; #    Thank you   ----- Message -----  From: Rober Ness III, MD  Sent: 10/17/2023   4:33 PM CDT  To: Khushi Barnes MD; Dannie Soliz LPN; #    Thank you all    I was not concerned about neuropathy on my exam    ----- Message -----  From: Khushi Barnes MD  Sent: 10/16/2023   4:18 PM CDT  To: Dannie Soliz LPN; Ladonna Nicolas RN; #    Thank you   ----- Message -----  From: Ladonna Nicolas RN  Sent: 10/16/2023   3:15 PM CDT  To: Khushi Barnes MD; Dannie Soliz LPN; #    He returned my call.     Asked about neuropathy.  Patient reports last he does have some minor issues with balance, but over all does not think there will be any issues with ambulation post op.   Reports he has no issues with swallowing.      I let him know that his cardiologist's office has not responded to optimization statement. He has an appt with their office on 10/20.  I asked him to please ask provider to put statement in note as we can see records in Epic.   He is seeing thoracic surgeon on 10/24.    He is seeing pcp on 10/26.       He and I discussed medication hold preop.    Advised him to arrive on 10/30 in anticipation of all preop appts on 10/31.   Planning for surgery on 11/1.     ----- Message -----  From: Khushi Barnes MD  Sent: 10/16/2023   6:30 AM CDT  To: Dannie Soliz LPN; Ladonna Nicolas RN; #    Thanks Ladonna    I worked on his chart this morning     With regards to neuropathy , decreased sensation down the left knee, is he able to walk with no problems ? So that this does not affect the recovery from the  opposite knee replacement     Cardiologist statement of optimization will be useful     GI note indicates esophageal dysmotility   How is he doing with his Swallowig ?    How did the thoracic surgery evaluation go with regards to the lung lesion - Please obtain the note     Looks like we have Cardiology, PCP, Pulmonology clearances pending     I see that we have him for surgery on 11/ 1     Thank you        ----- Message -----  From: Ladonna Nicolas RN  Sent: 9/28/2023   2:04 PM CDT  To: Khushi Barnes MD; Dannie Soliz LPN; #    Good afternoon!     I have updates:    -Med review is done and all of your below questions are addressed at the bottom - please see my phone note dated today (9/28/23).  -Has had another CT  -I've re-sent cards and gi clearance letters  - dental clearance was sent yesterday (appt was 9/26/23)  -he has an appt for pcp clearance on 10/21/23   - have location for his PT once he gets back home  - I did ask about spine surgery.  He was vague, but we've determined it was a lumbar fusion. Does not know level. This was done in 2008.  [facility] only goes back to 2015.  I called the facility.  They said there's a slight chance they'd have it, but it is in storage and have to be requested and that would take a few weeks to process.     Please let me know if you have further questions.    Ladonan Marr    ----- Message -----  From: Khushi Barnes MD  Sent: 9/21/2023   2:32 PM CDT  To: Dannie Soliz LPN; Ladonna Nicolas RN; #    Ladonna    I reviewed his chart this afternoon   Thankfully, lot of his information is in care    When you do medical questionnaire   Please check on the following     With regards to lung lesion CT from March 2023 reportedly showed  Stable chest. No acute process.   Somewhat tubular/nodular density within the right upper lobe which may be   vascular in etiology. Stability suggests benign etiology. Delayed follow-up   chest CT in 1 year can be obtained to  confirm long-term stability.   It appears to me that his chest lesion is stable based on the CT report  Pulmonologist evaluation suggests possible AVM  Cardiovascular surgeons note indicates patient has preference to have this evaluated with a biopsy    Last cardiologist's note from June 2023 indicates chest pain   His stress test since that time was reassuring   Please check if he has ongoing chest pains   Please obtain cardiologist's optimization note for surgery    BPH-how  is he  urinating ? Is he able to empty the bladder completely as , if he is unable to empty the bladder, he will be at risk of postoperative urinary retention ( POUR)     Noted history of spine surgery-please obtain  information about spine surgery, if it is lumbar, cervical   please obtain the OR note so that we know which level the surgery was done so that the anesthesiologist will benefit from this information    I see Norco use-I suggest he works with his [place] prescribing MD to possibly try to reduce the pain medication use in preparation for surgery, to reduce opioid tolerance    Does he have any high BMI related health conditions like fatty liver  I see that he does have hypertension and prediabetes , osteoarthritis, prediabetes      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  I see that he is on Trelegy which is a combination of long-acting beta agonist, inhaled corticosteroid long-acting muscarinic antagonist   Hope he knows to rinse his mouth after using the steroid inhaler, as without that he will be at risk of oral thrush  Is he needing rescue albuterol much?    I see mention of neuropathy- I also see mention of spine surgery - decreased sensation L knee to foot  Does he have any other symptoms of neuropathy?    I saw mention of  frequent UTIs   Any recent problem with UTIs?  Or any current symptoms of UTI  Please let him know to call us if he has any UTI symptoms closer to surgery and address the UTI before leaving for  surgery    Is he on CPAP/BiPAP for his sleep apnea?    Based on his medication list,  he is taking both Lasix and torsemide- it is uncommon for individuals to take both loop diuretics , hence he might not be taking both of them    Please reconcile medication from outside sources    Sorry about this lengthy note

## 2023-11-06 ENCOUNTER — TELEPHONE (OUTPATIENT)
Dept: ORTHOPEDICS | Facility: CLINIC | Age: 78
End: 2023-11-06
Payer: MEDICARE

## 2023-11-06 NOTE — TELEPHONE ENCOUNTER
I called the patient today regarding his voice message. The patient stated that he finished his halter monitor and his other labs. The patient said that he hasn't heard back from Dr. Ramirez. The The patient verbalized understanding and has no further questions.         ----- Message from Luzma Hutchinson sent at 11/6/2023  3:05 PM CST -----  Regarding: pt call back  Contact: [phone]  Pt requesting a call back no details given. Please give pt a call back thanks.

## 2023-11-08 ENCOUNTER — TELEPHONE (OUTPATIENT)
Dept: ORTHOPEDICS | Facility: CLINIC | Age: 78
End: 2023-11-08
Payer: MEDICARE

## 2023-11-08 NOTE — TELEPHONE ENCOUNTER
I talked to the patient regarding cardiology clearance. The patient stated that he went to an appointment, did an EKG, and has been waiting to hear back about it. The patient stated that he is wondering about his next steps. I informed the patient that Adams is in the OR today and that I will reach out to see if we are still awaiting cardiology clearance.    The patient verbalized understanding and has no further questions.     ----- Message from Bassam Jenelle sent at 11/8/2023  3:57 PM CST -----  Regarding: FW: pt advice  Contact: pt [phone]  Good afternoon,    Not quite sure who called him. The only thing that we need from him is for him to get cleared by his cardiologist. Once he gets it he should call us so we can get him on the surgery schedule.     Sincerely,   Adams Ortegaers MS, OTC  OR & Clinical Assistant to Dr. Rober Ness III  Phone: [phone]  Fax: [phone]    ----- Message -----  From: Catrachita Baziz  Sent: 11/8/2023   3:51 PM CST  To: Gro[facility] GUTIERREZ Staff  Subject: pt advice                                        Pt returning call from missed call. Pls call

## 2023-11-28 ENCOUNTER — TELEPHONE (OUTPATIENT)
Dept: ORTHOPEDICS | Facility: CLINIC | Age: 78
End: 2023-11-28
Payer: MEDICARE

## 2023-11-28 NOTE — TELEPHONE ENCOUNTER
The patient called and said that his cardiology tests came back normal. I called Dr. Felipe Ahumada [phone]. The patient stated that he was cleared from his cardiologist. I called Dr. Wang's office and they are going to send the letter of clearance.         ----- Message from Williams Arias sent at 11/28/2023 11:51 AM CST -----  Regarding: Call request for Adams  Contact: [phone]  Hi, pt called to request a call back from Adams. Pls call the pt at  [phone] to spk with the pt.  Thank you.

## 2023-12-01 ENCOUNTER — TELEPHONE (OUTPATIENT)
Dept: ORTHOPEDICS | Facility: CLINIC | Age: 78
End: 2023-12-01
Payer: MEDICARE

## 2023-12-01 NOTE — TELEPHONE ENCOUNTER
I called the patient today regarding his voice message. I told the patient that I have not yet received his clearance letter. He is going to call his cardiologist. The patient verbalized understanding and has no further questions.           ----- Message from Karla Harrison sent at 12/1/2023 12:08 PM CST -----  Regarding: PT IS CALLING FOR MEY REGARDING SURGERY  Contact: pt      Confirmed contact info below:  Contact Name: Richi Funk  Phone Number: [phone]

## 2023-12-05 ENCOUNTER — TELEPHONE (OUTPATIENT)
Dept: ORTHOPEDICS | Facility: CLINIC | Age: 78
End: 2023-12-05
Payer: MEDICARE

## 2023-12-05 NOTE — TELEPHONE ENCOUNTER
I called the patient today regarding his voice message. I told him that we received his cardiology clearance letter and sent a message to Dr. Ness and Dr. Barnes. The patient has a note in his chart about a possible LLE ulcer. He said its something he has had for a long time and is healed. He is going to send a picture to the [facility] so it can be uploaded into his chart . The patient verbalized understanding and has no further questions.               ----- Message from Pattie De La Torre sent at 12/5/2023  1:10 PM CST -----  Regarding: Appt  Contact: Pt @[phone]  Pt Calling to schedule an Appt pt would like for the appt to be after the 17th of the month  of December Please call Pt @[phone]

## 2023-12-25 PROBLEM — Z87.440 HISTORY OF UTI: Status: RESOLVED | Noted: 2020-12-23 | Resolved: 2023-12-25

## 2023-12-26 ENCOUNTER — TELEPHONE (OUTPATIENT)
Dept: ORTHOPEDICS | Facility: CLINIC | Age: 78
End: 2023-12-26
Payer: MEDICARE

## 2023-12-26 NOTE — TELEPHONE ENCOUNTER
I called and spoke to the patient regarding the message below. The patient stated that he is waiting to hear about scheduling a surgery date. I informed the patient that I would send a message to Adams regarding this.    The patient verbalize understanding and has no further questions.       ----- Message from Smiley Dinero sent at 12/26/2023 12:48 PM CST -----  Regarding: Appt;  Contact: pt [phone]  Pt is calling to speak to Adams or someone in the office. Pt states that he is still waiting on an appt date for his procedure. Please call pt to advise, Thanks

## 2023-12-27 ENCOUNTER — DOCUMENTATION ONLY (OUTPATIENT)
Dept: INTERNAL MEDICINE | Facility: CLINIC | Age: 78
End: 2023-12-27
Payer: MEDICARE

## 2023-12-27 DIAGNOSIS — G62.9 NEUROPATHY: Primary | ICD-10-CM

## 2023-12-27 DIAGNOSIS — I51.9 LEFT VENTRICULAR SYSTOLIC DYSFUNCTION (LVSD): ICD-10-CM

## 2023-12-27 DIAGNOSIS — I10 ESSENTIAL HYPERTENSION: ICD-10-CM

## 2023-12-27 DIAGNOSIS — R73.03 PREDIABETES: ICD-10-CM

## 2023-12-27 DIAGNOSIS — Z87.438 HISTORY OF BPH: ICD-10-CM

## 2023-12-27 DIAGNOSIS — Z86.14 HISTORY OF MRSA INFECTION: ICD-10-CM

## 2023-12-27 DIAGNOSIS — K59.00 CONSTIPATION, UNSPECIFIED CONSTIPATION TYPE: ICD-10-CM

## 2023-12-27 DIAGNOSIS — G47.33 OSA (OBSTRUCTIVE SLEEP APNEA): ICD-10-CM

## 2023-12-27 DIAGNOSIS — F11.90 OPIOID USE: ICD-10-CM

## 2023-12-27 DIAGNOSIS — R91.1 LESION OF LUNG: ICD-10-CM

## 2023-12-27 DIAGNOSIS — Z01.89 LABORATORY TEST: ICD-10-CM

## 2023-12-27 DIAGNOSIS — J44.9 CHRONIC OBSTRUCTIVE PULMONARY DISEASE, UNSPECIFIED COPD TYPE: ICD-10-CM

## 2023-12-27 DIAGNOSIS — Z92.89 HISTORY OF STRESS TEST: ICD-10-CM

## 2023-12-27 NOTE — ASSESSMENT & PLAN NOTE
Narcotic use - 1 tab po every 2-3 days -    In view of the opioid use, the patient may have opioid tolerance . I suggest considering the possibility of opioid tolerance  in planning post operative pain control     I suggest possibly reducing the opioid use , in collaboration with the prescribing provider ,in preparation for surgery , so that it reduces the opioid tolerance which helps post op pain control

## 2023-12-27 NOTE — ASSESSMENT & PLAN NOTE
Neuropathy  Neuropathy.  Patient reports last he does have some minor issues with balance, but over all does not think there will be any issues with ambulation post op.

## 2023-12-27 NOTE — PROGRESS NOTES
[facility]  [facility] Note    Patient Name: Richi Funk  MRN: [id number]  Date of Evaluation- 12/27/2023  PCP- Nola, Primary Doctor        HPI:  Chart reviewed in preparation for joint replacement surgery     Male   Age 78 Y     I have worked on his chart in the past     During my last chart review - Echo showed - Aortic regurgitation   Noted plan for - Recommend further evaluation of his aortic valve with a ROSHNI before considering elective surgery.    Note from 11/4 /2023    Labs checking thyroid, kidneys, liver, electrolytes, cell counts, cholesterol, and average blood sugars all return normal. No changes to treatment indicated based on these results.         Left ventricular systolic dysfunction (LVSD)  Echo shows LV systolic function is mildly reduced at 45-50%.  Summary and Conclusion:  - Left ventricle: The cavity size is dilated. Wall thickness is increased.  Systolic function is mildly reduced. The estimated ejection fraction is  45-50%.  - Right ventricle: The cavity size is dilated. The estimated peak pressure is  26mm Hg.  - Left atrium: The atrium is dilated.  - Right atrium: The atrium is dilated.  - Aortic valve: The annulus is mildly calcified. The valve is trileaflet. The  leaflets are thickened. There is severe regurgitation. The peak systolic  velocity is 239cm/sec. The mean systolic gradient is 10mm Hg.  - Mitral valve: There is mild regurgitation.  - Tricuspid valve: There is mild regurgitation.  Comparison: Compared to the previous study, aortic regurgitationhas worsened.          Assessment/Plan:     Neuropathy  Neuropathy  Neuropathy.  Patient reports last he does have some minor issues with balance, but over all does not think there will be any issues with ambulation post op.           Opioid use  Narcotic use - 1 tab po every 2-3 days -    In view of the opioid use, the patient may have opioid tolerance . I suggest considering the possibility of opioid tolerance  in planning  post operative pain control     I suggest possibly reducing the opioid use , in collaboration with the prescribing provider ,in preparation for surgery , so that it reduces the opioid tolerance which helps post op pain control         COPD (chronic obstructive pulmonary disease)  On Trelegy, albuterol     Feb 2021     IMPRESSION:  Minimal obstructive lung disease with insignificant response to bronchodilator.      Lesion of lung  RIGHT Upper lobe lung nodule, stable over 1 year. He is a candidate for ongoing surveillance.    Repeat CT chest in 1 year     Essential hypertension  Losartan    History of stress test  July 2023     No evidence of myocardial infarction. No evidence of induced myocardial   ischemia.      June 2023          Nov 2022          May 2022     1. Sinus bradycardia.   2. Frequent PVCs accounting for 9% of total QRSs.  These are randomly presented without any pattern.  There is no VT.      Jan 2022     Left main: 0%   Left anterior descending artery: 40% mid lesion   Left circumflex artery: 30% prox lesion   Right coronary artery: 60% distal lesion     iFR of distal RCA was 1.00 consistent with non-obstructive disease.     Conclusion: significant but non-obstructive coronary artery disease         Left ventricular systolic dysfunction (LVSD)  Echo shows LV systolic function is mildly reduced at 45-50%.  Summary and Conclusion:  - Left ventricle: The cavity size is dilated. Wall thickness is increased.  Systolic function is mildly reduced. The estimated ejection fraction is  45-50%.  - Right ventricle: The cavity size is dilated. The estimated peak pressure is  26mm Hg.  - Left atrium: The atrium is dilated.  - Right atrium: The atrium is dilated.  - Aortic valve: The annulus is mildly calcified. The valve is trileaflet. The  leaflets are thickened. There is severe regurgitation. The peak systolic  velocity is 239cm/sec. The mean systolic gradient is 10mm Hg.  - Mitral valve: There is mild  regurgitation.  - Tricuspid valve: There is mild regurgitation.  Comparison: Compared to the previous study, aortic regurgitationhas worsened.       History of BPH  He will be at risk of postoperative urinary retention given the BPH and I suggest monitoring his bladder volume during and immediately after surgery to be able to address potential bladder stretch and urinary retention as a result  I suggest using straight catheterization to decompress the bladder before it gets to the point of bladder stretching and urinary retention    History of MRSA infection  I suggest nasal Bactroban decolonization in attempt to prevent surgical site infection prophylaxis     BMI 36.0-36.9,adult  Noted  Hypertension   GERD  Osteoarthritis    Prediabetes  Most recent A1c from September 2023 is 5.8     Constipation  Likely opioid contribution   Miralax     Laboratory test  A1c from September 14 th 2023 was 5.8  INR- 1.1  Comprehensive metabolic panel is unremarkable   CBC is unremarkable    MELIDA (obstructive sleep apnea)  Has MELIDA dx, does not use CPAP     -    As per correspondence -Reports he has no issues with swallowing.     For BP- on Entresto, (switched from losartan )        Will let him proceed with surgery , once approved by his Primary care provider   Will review ROSHNI    Hold Vitamin E/ Multivitamin per guidelines   Suggest medication  reconciliation - his Losartan might have been changed to Entresto from what I see   I also see two loop diuretics- Lasix, Demadex

## 2023-12-27 NOTE — HPI
Chart reviewed in preparation for joint replacement surgery     Male   Age 78 Y     I have worked on his chart in the past     During my last chart review - Echo showed - Aortic regurgitation   Noted plan for - Recommend further evaluation of his aortic valve with a ROSHNI before considering elective surgery.    Note from 11/4 /2023    Labs checking thyroid, kidneys, liver, electrolytes, cell counts, cholesterol, and average blood sugars all return normal. No changes to treatment indicated based on these results.         Left ventricular systolic dysfunction (LVSD)  Echo shows LV systolic function is mildly reduced at 45-50%.  Summary and Conclusion:  - Left ventricle: The cavity size is dilated. Wall thickness is increased.  Systolic function is mildly reduced. The estimated ejection fraction is  45-50%.  - Right ventricle: The cavity size is dilated. The estimated peak pressure is  26mm Hg.  - Left atrium: The atrium is dilated.  - Right atrium: The atrium is dilated.  - Aortic valve: The annulus is mildly calcified. The valve is trileaflet. The  leaflets are thickened. There is severe regurgitation. The peak systolic  velocity is 239cm/sec. The mean systolic gradient is 10mm Hg.  - Mitral valve: There is mild regurgitation.  - Tricuspid valve: There is mild regurgitation.  Comparison: Compared to the previous study, aortic regurgitationhas worsened.

## 2023-12-27 NOTE — ASSESSMENT & PLAN NOTE
He will be at risk of postoperative urinary retention given the BPH and I suggest monitoring his bladder volume during and immediately after surgery to be able to address potential bladder stretch and urinary retention as a result  I suggest using straight catheterization to decompress the bladder before it gets to the point of bladder stretching and urinary retention

## 2023-12-27 NOTE — ASSESSMENT & PLAN NOTE
I suggest nasal Bactroban decolonization in attempt to prevent surgical site infection prophylaxis

## 2024-01-02 ENCOUNTER — PATIENT MESSAGE (OUTPATIENT)
Dept: ORTHOPEDICS | Facility: CLINIC | Age: 79
End: 2024-01-02
Payer: MEDICARE

## 2024-01-12 ENCOUNTER — TELEPHONE (OUTPATIENT)
Dept: ORTHOPEDICS | Facility: CLINIC | Age: 79
End: 2024-01-12
Payer: MEDICARE

## 2024-01-12 NOTE — TELEPHONE ENCOUNTER
I called the patient today regarding his surgery.  The patient had a recent TTE. [facility] will [facility]. He is wanting surgery in April 2024. The patient verbalized understanding and has no further questions.

## 2024-03-02 NOTE — HPI
Chart reviewed in preparation for joint replacement surgery   Male of age 78 years    Health conditions of significance for the perioperative period      - BMI 36.04-  - COPD and right lung lesion of ? Etiology  - Opioid use   - BPH on flomax  - Spine surgery - decreased sensation L knee to foot  - Pre DM  Frequent UTI's  Lung nodule - serial surveillance   - HTN  - MELIDA       
yes

## 2024-04-25 ENCOUNTER — TELEPHONE (OUTPATIENT)
Dept: ORTHOPEDICS | Facility: CLINIC | Age: 79
End: 2024-04-25
Payer: MEDICARE

## 2024-04-25 NOTE — TELEPHONE ENCOUNTER
I called the patient today regarding his voice message. I left a message for the patient to call me back. I left my name and phone number.        ----- Message from Alejandro Ramirez MA sent at 4/25/2024  2:20 PM CDT -----  Regarding: pt advice  Contact: pt at  [phone]  Pt is calling to speak with pat  in provider office is asking for a return call needing to know if he need to bring images or any medical records please call pt at  [phone]

## 2024-10-30 ENCOUNTER — TELEPHONE (OUTPATIENT)
Dept: ORTHOPEDICS | Facility: CLINIC | Age: 79
End: 2024-10-30
Payer: MEDICARE

## 2024-12-10 ENCOUNTER — TELEPHONE (OUTPATIENT)
Dept: ORTHOPEDICS | Facility: CLINIC | Age: 79
End: 2024-12-10
Payer: MEDICARE

## 2024-12-10 NOTE — TELEPHONE ENCOUNTER
I called the patient today regarding  the message below. I left a message for the patient to call me back. I left my name and phone number.  Need to find out if he ever had surgery because I do not see it in the system and also need to get pt scheduled with Dr. Ness.    Sincerely,  Payton Willett [facility]   Certified Clinical Medical Assistant to Dr. Rober tolbert  Phone: [phone]  Fax: [phone]              ----- Message from Jhonny sent at 12/10/2024  2:24 PM CST -----  Name Of Caller:  Richi        Provider Name: Rober Ness         Does patient feel the need to be seen today? no        Relationship to the Pt?: patient        Contact Preference?: [phone]         What is the nature of the call?:Patient states that he has a knee surgery with Dr Ness on 11-, and had to cancel his follow-up/post-op appointment. Patient states that he would like to speak with someone in the office to see when he needs to scheduled another follow-up appointment.